# Patient Record
Sex: MALE | Race: WHITE | Employment: OTHER | URBAN - METROPOLITAN AREA
[De-identification: names, ages, dates, MRNs, and addresses within clinical notes are randomized per-mention and may not be internally consistent; named-entity substitution may affect disease eponyms.]

---

## 2017-01-01 ENCOUNTER — OFFICE VISIT (OUTPATIENT)
Dept: CARDIOLOGY CLINIC | Age: 82
End: 2017-01-01

## 2017-01-01 ENCOUNTER — TELEPHONE (OUTPATIENT)
Dept: CARDIOLOGY CLINIC | Age: 82
End: 2017-01-01

## 2017-01-01 VITALS
DIASTOLIC BLOOD PRESSURE: 88 MMHG | SYSTOLIC BLOOD PRESSURE: 136 MMHG | WEIGHT: 234.6 LBS | BODY MASS INDEX: 31.77 KG/M2 | HEART RATE: 88 BPM | HEIGHT: 72 IN

## 2017-01-01 DIAGNOSIS — Z95.0 CARDIAC PACEMAKER IN SITU: ICD-10-CM

## 2017-01-01 DIAGNOSIS — I25.810 CORONARY ATHEROSCLEROSIS OF AUTOLOGOUS VEIN BYPASS GRAFT WITHOUT ANGINA: Primary | Chronic | ICD-10-CM

## 2017-01-01 DIAGNOSIS — I10 ESSENTIAL HYPERTENSION: Chronic | ICD-10-CM

## 2017-01-01 PROCEDURE — 93280 PM DEVICE PROGR EVAL DUAL: CPT | Performed by: INTERNAL MEDICINE

## 2017-01-01 PROCEDURE — 1036F TOBACCO NON-USER: CPT | Performed by: INTERNAL MEDICINE

## 2017-01-01 PROCEDURE — G8598 ASA/ANTIPLAT THER USED: HCPCS | Performed by: INTERNAL MEDICINE

## 2017-01-01 PROCEDURE — G8417 CALC BMI ABV UP PARAM F/U: HCPCS | Performed by: INTERNAL MEDICINE

## 2017-01-01 PROCEDURE — 1123F ACP DISCUSS/DSCN MKR DOCD: CPT | Performed by: INTERNAL MEDICINE

## 2017-01-01 PROCEDURE — G8427 DOCREV CUR MEDS BY ELIG CLIN: HCPCS | Performed by: INTERNAL MEDICINE

## 2017-01-01 PROCEDURE — 99204 OFFICE O/P NEW MOD 45 MIN: CPT | Performed by: INTERNAL MEDICINE

## 2017-01-01 PROCEDURE — G8484 FLU IMMUNIZE NO ADMIN: HCPCS | Performed by: INTERNAL MEDICINE

## 2017-01-01 PROCEDURE — 4040F PNEUMOC VAC/ADMIN/RCVD: CPT | Performed by: INTERNAL MEDICINE

## 2017-01-01 RX ORDER — WARFARIN SODIUM 2 MG/1
TABLET ORAL
Refills: 1 | COMMUNITY
Start: 2017-01-01 | End: 2018-01-01 | Stop reason: DRUGHIGH

## 2017-04-28 ENCOUNTER — HOSPITAL ENCOUNTER (OUTPATIENT)
Dept: SLEEP CENTER | Age: 82
Discharge: HOME | End: 2017-04-28
Attending: INTERNAL MEDICINE | Admitting: INTERNAL MEDICINE

## 2017-05-02 ENCOUNTER — HOSPITAL ENCOUNTER (OUTPATIENT)
Dept: PULMONOLOGY | Age: 82
Discharge: HOME | End: 2017-05-31
Attending: INTERNAL MEDICINE | Admitting: INTERNAL MEDICINE

## 2017-05-10 ENCOUNTER — TELEPHONE (OUTPATIENT)
Dept: PHYSICAL MEDICINE AND REHAB | Age: 82
End: 2017-05-10

## 2017-05-11 ENCOUNTER — OFFICE VISIT (OUTPATIENT)
Dept: PHYSICAL MEDICINE AND REHAB | Age: 82
End: 2017-05-11

## 2017-05-11 DIAGNOSIS — G63 POLYNEUROPATHY ASSOCIATED WITH UNDERLYING DISEASE (HCC): Primary | ICD-10-CM

## 2017-05-11 DIAGNOSIS — R20.2 PARESTHESIA OF BILATERAL LEGS: ICD-10-CM

## 2017-05-11 DIAGNOSIS — R29.898 LEG WEAKNESS, BILATERAL: ICD-10-CM

## 2017-05-11 PROCEDURE — 95910 NRV CNDJ TEST 7-8 STUDIES: CPT | Performed by: PHYSICAL MEDICINE & REHABILITATION

## 2017-05-11 PROCEDURE — 1036F TOBACCO NON-USER: CPT | Performed by: PHYSICAL MEDICINE & REHABILITATION

## 2017-05-11 PROCEDURE — 95886 MUSC TEST DONE W/N TEST COMP: CPT | Performed by: PHYSICAL MEDICINE & REHABILITATION

## 2017-05-19 LAB
AVERAGE GLUCOSE: NORMAL
AVERAGE GLUCOSE: NORMAL
HBA1C MFR BLD: 7.8 %
HBA1C MFR BLD: 7.8 %

## 2017-06-11 PROBLEM — I63.9 CEREBROVASCULAR ACCIDENT (CVA) (HCC): Status: ACTIVE | Noted: 2017-05-22

## 2017-06-11 PROBLEM — I63.9 ISCHEMIC STROKE (HCC): Status: ACTIVE | Noted: 2017-05-18

## 2017-06-11 PROBLEM — I63.321: Status: ACTIVE | Noted: 2017-05-22

## 2017-06-19 PROBLEM — G60.8 POLYNEUROPATHY, PERIPHERAL SENSORIMOTOR AXONAL: Status: ACTIVE | Noted: 2017-06-19

## 2017-06-19 PROBLEM — I69.354 HEMIPLEGIA AND HEMIPARESIS FOLLOWING CEREBRAL INFARCTION AFFECTING LEFT NON-DOMINANT SIDE (HCC): Status: ACTIVE | Noted: 2017-06-19

## 2017-06-19 PROBLEM — R26.9 GAIT DISTURBANCE: Status: ACTIVE | Noted: 2017-06-19

## 2017-06-19 PROBLEM — I69.322 DYSARTHRIA DUE TO RECENT CEREBRAL INFARCTION: Status: ACTIVE | Noted: 2017-06-19

## 2017-11-09 NOTE — PROGRESS NOTES
CARDIOLOGY NOTE      11/9/2017    RE: Mikey Verma  (11/25/1931)                               TO:  Dr. Salome Chatterjee MD      Thank you for involving me in taking care of your  patient Mikey Verma, who is a  80y.o. year old      male with past medical  history of  CAD, HTN, hyperlipidimea, PCI, PPM , CVA, DM is  seen today Patient  during this  visit has no cardiac complains. .      Vitals:    11/09/17 1426   BP: 136/88   Pulse: 88       Current Outpatient Prescriptions   Medication Sig Dispense Refill    warfarin (COUMADIN) 2 MG tablet TAKE 1 TABLET EVERY DAY  1    metFORMIN (GLUCOPHAGE) 500 MG tablet TAKE 1 TABLET BY MOUTH TWICE A DAY FOR DIABETES  0    insulin glargine (LANTUS) 100 UNIT/ML injection vial Inject 20 Units into the skin nightly 1 vial 0    amLODIPine (NORVASC) 10 MG tablet Take 1 tablet by mouth daily 30 tablet 0    dipyridamole-aspirin (AGGRENOX)  MG per extended release capsule Take 1 capsule by mouth 2 times daily 60 capsule 0    atorvastatin (LIPITOR) 40 MG tablet Take 80 mg by mouth nightly       escitalopram (LEXAPRO) 10 MG tablet Take 10 mg by mouth daily      pregabalin (LYRICA) 100 MG capsule Take 100 mg by mouth nightly        No current facility-administered medications for this visit. Allergies: Cephalexin; Inderal [propranolol hcl]; Beta adrenergic blockers; Cephalexin; Glipizide;  Lisinopril; Propranolol; and Tape [adhesive tape]  Past Medical History:   Diagnosis Date    Arthritis     spine, neck    Blood circulation, collateral     CAD (coronary artery disease)     Cancer (HCC)     Collapsed lung     left lung    [facility]     Diabetes mellitus [facility]     Hyperlipidemia     Hypertension     Kidney stone     Kidney stone     per wife this is 33rd stone, also became septic    MI (myocardial infarction)     Pneumonia     Polyneuropathy, peripheral sensorimotor axonal 6/19/2017    Psychiatric problem pulses normal  Abdomen  no tenderness  Musculoskeletal  normal strength  Neurologic    There are  no gross focal neurologic abnormalities. Skin-  No rash  Affect; normal mood    DATA:  Lab Results   Component Value Date    CKTOTAL 40 04/04/2017    TROPONINI <0.006 01/02/2014    TROPONINI <0.006 03/18/2012     BNP:    Lab Results   Component Value Date    BNP 63 01/02/2014     PT/INR:  No results found for: PTINR  Lab Results   Component Value Date    LABA1C 9.5 (H) 09/14/2017    LABA1C 7.8 05/19/2017    LABA1C 7.8 05/19/2017     Lab Results   Component Value Date    CHOL 180 09/15/2017    TRIG 189 (H) 09/15/2017    HDL 34 (L) 09/15/2017    LDLDIRECT 123 (H) 09/15/2017     Lab Results   Component Value Date    ALT 13 09/14/2017    AST 17 09/14/2017     TSH:  No results found for: TSH      QUALITY MEASURES:  CAD:  Yes   CHOL LOWERING:  Yes- if No Why  ANTIPLATELET:  Yes - if No why  BETA BLOCKER    yes,   IF  NO WHY  SMOKING HISTORY no COUNSELLED no  ATRIAL FIBRILLATIONno ANTICOAG: no    Assessment/ Plan:     Patient seen , interviewed and examined      -  Hypertension: Patients blood pressure is normal. Patient is advised about low sodium diet. Present medical regimen will not be changed. -  LIPID MANAGEMENT:  Available lipid  lab data reviewed  and patient was given dietary advice. NCEP- ATP III guidelines reviewed with patient. -   Changes  in medicines made: No                         -   DIABETES MELLITUS: Available pertinent lab data reviewed   and  patient was given dietary advice . Advised to check blood glucose level on a regular basis. -   Changes  in medicines made: No              - PPM will check today        -     CORONARY ARTERY DISEASE: Patient  currently as per anginal classification has   No symptoms           - changes in  treatment:   no  All CAD tests available in records reviewed.        SVG to OM, LIMa to LAD, patent stent, normal EF      - CVA SP TPA  5/17     - PAF on

## 2017-11-09 NOTE — PATIENT INSTRUCTIONS
Please remember to bring all medication bottles or a medication list with you to your appointment. If you have any questions, please call our office at [phone].

## 2018-01-01 ENCOUNTER — HOSPITAL ENCOUNTER (INPATIENT)
Dept: MEDSURG UNIT | Age: 83
LOS: 8 days | DRG: 057 | End: 2018-09-24
Attending: FAMILY MEDICINE | Admitting: FAMILY MEDICINE
Payer: COMMERCIAL

## 2018-01-01 ENCOUNTER — TELEPHONE (OUTPATIENT)
Dept: CARDIOLOGY CLINIC | Age: 83
End: 2018-01-01

## 2018-01-01 ENCOUNTER — HOSPITAL ENCOUNTER (OUTPATIENT)
Dept: GENERAL RADIOLOGY | Age: 83
Discharge: HOME | End: 2018-04-20
Attending: ORTHOPAEDIC SURGERY | Admitting: ORTHOPAEDIC SURGERY

## 2018-01-01 ENCOUNTER — NURSE ONLY (OUTPATIENT)
Dept: CARDIOLOGY CLINIC | Age: 83
End: 2018-01-01

## 2018-01-01 ENCOUNTER — OFFICE VISIT (OUTPATIENT)
Dept: CARDIOLOGY CLINIC | Age: 83
End: 2018-01-01

## 2018-01-01 ENCOUNTER — PROCEDURE VISIT (OUTPATIENT)
Dept: CARDIOLOGY CLINIC | Age: 83
End: 2018-01-01

## 2018-01-01 ENCOUNTER — HOSPITAL ENCOUNTER (OUTPATIENT)
Dept: GENERAL RADIOLOGY | Age: 83
Discharge: HOME | End: 2018-05-14
Attending: ORTHOPAEDIC SURGERY | Admitting: ORTHOPAEDIC SURGERY

## 2018-01-01 ENCOUNTER — HOSPITAL ENCOUNTER (OUTPATIENT)
Dept: GENERAL RADIOLOGY | Age: 83
Discharge: HOME | End: 2018-06-08

## 2018-01-01 ENCOUNTER — HOSPITAL ENCOUNTER (OUTPATIENT)
Dept: GENERAL RADIOLOGY | Age: 83
Discharge: HOME | End: 2018-05-14

## 2018-01-01 ENCOUNTER — HOSPITAL ENCOUNTER (OUTPATIENT)
Dept: GENERAL RADIOLOGY | Age: 83
Discharge: HOME | End: 2018-05-11
Attending: ORTHOPAEDIC SURGERY | Admitting: ORTHOPAEDIC SURGERY

## 2018-01-01 ENCOUNTER — HOSPITAL ENCOUNTER (OUTPATIENT)
Dept: NUCLEAR MEDICINE | Age: 83
Discharge: HOME | End: 2018-05-03
Attending: ORTHOPAEDIC SURGERY | Admitting: ORTHOPAEDIC SURGERY

## 2018-01-01 ENCOUNTER — HOSPITAL ENCOUNTER (OUTPATIENT)
Dept: GENERAL RADIOLOGY | Age: 83
Discharge: HOME | End: 2018-02-15
Attending: INTERNAL MEDICINE | Admitting: INTERNAL MEDICINE

## 2018-01-01 VITALS
HEART RATE: 84 BPM | DIASTOLIC BLOOD PRESSURE: 60 MMHG | WEIGHT: 226 LBS | SYSTOLIC BLOOD PRESSURE: 114 MMHG | BODY MASS INDEX: 29.95 KG/M2 | HEIGHT: 73 IN

## 2018-01-01 VITALS
WEIGHT: 224 LBS | HEART RATE: 84 BPM | SYSTOLIC BLOOD PRESSURE: 142 MMHG | BODY MASS INDEX: 29.69 KG/M2 | DIASTOLIC BLOOD PRESSURE: 80 MMHG | HEIGHT: 73 IN

## 2018-01-01 VITALS
HEART RATE: 68 BPM | BODY MASS INDEX: 28.58 KG/M2 | HEIGHT: 72 IN | WEIGHT: 211 LBS | DIASTOLIC BLOOD PRESSURE: 76 MMHG | SYSTOLIC BLOOD PRESSURE: 118 MMHG

## 2018-01-01 VITALS
DIASTOLIC BLOOD PRESSURE: 60 MMHG | WEIGHT: 203 LBS | OXYGEN SATURATION: 87 % | HEART RATE: 154 BPM | BODY MASS INDEX: 29.06 KG/M2 | SYSTOLIC BLOOD PRESSURE: 110 MMHG | RESPIRATION RATE: 35 BRPM | TEMPERATURE: 101.6 F | HEIGHT: 70 IN

## 2018-01-01 VITALS — HEART RATE: 84 BPM | DIASTOLIC BLOOD PRESSURE: 86 MMHG | SYSTOLIC BLOOD PRESSURE: 142 MMHG

## 2018-01-01 VITALS — HEART RATE: 68 BPM | SYSTOLIC BLOOD PRESSURE: 118 MMHG | DIASTOLIC BLOOD PRESSURE: 76 MMHG

## 2018-01-01 DIAGNOSIS — G89.29 CHRONIC BILATERAL LOW BACK PAIN WITH BILATERAL SCIATICA: ICD-10-CM

## 2018-01-01 DIAGNOSIS — M54.42 CHRONIC BILATERAL LOW BACK PAIN WITH BILATERAL SCIATICA: ICD-10-CM

## 2018-01-01 DIAGNOSIS — I10 ESSENTIAL HYPERTENSION: Chronic | ICD-10-CM

## 2018-01-01 DIAGNOSIS — Z95.0 CARDIAC PACEMAKER IN SITU: Primary | ICD-10-CM

## 2018-01-01 DIAGNOSIS — R94.31 EKG ABNORMALITIES: ICD-10-CM

## 2018-01-01 DIAGNOSIS — I25.700 CORONARY ARTERY DISEASE INVOLVING CORONARY BYPASS GRAFT OF NATIVE HEART WITH UNSTABLE ANGINA PECTORIS (HCC): ICD-10-CM

## 2018-01-01 DIAGNOSIS — I25.810 CORONARY ATHEROSCLEROSIS OF AUTOLOGOUS VEIN BYPASS GRAFT WITHOUT ANGINA: Primary | Chronic | ICD-10-CM

## 2018-01-01 DIAGNOSIS — I25.810 CORONARY ATHEROSCLEROSIS OF AUTOLOGOUS VEIN BYPASS GRAFT WITHOUT ANGINA: Chronic | ICD-10-CM

## 2018-01-01 DIAGNOSIS — Z45.010 PACER AT END OF BATTERY LIFE: Primary | ICD-10-CM

## 2018-01-01 DIAGNOSIS — R07.2 PRECORDIAL PAIN: ICD-10-CM

## 2018-01-01 DIAGNOSIS — M54.41 CHRONIC BILATERAL LOW BACK PAIN WITH BILATERAL SCIATICA: ICD-10-CM

## 2018-01-01 DIAGNOSIS — M47.817 ARTHRITIS OF LUMBOSACRAL SPINE: ICD-10-CM

## 2018-01-01 DIAGNOSIS — M54.5 LOW BACK PAIN, UNSPECIFIED BACK PAIN LATERALITY, UNSPECIFIED CHRONICITY, WITH SCIATICA PRESENCE UNSPECIFIED: ICD-10-CM

## 2018-01-01 DIAGNOSIS — R07.2 PRECORDIAL PAIN: Primary | ICD-10-CM

## 2018-01-01 LAB
ALBUMIN SERPL-MCNC: 3.5 G/DL
ALBUMIN SERPL-MCNC: 4 GM/DL (ref 3.4–5)
ALP BLD-CCNC: 73 U/L
ALP BLD-CCNC: 94 IU/L (ref 40–129)
ALT SERPL-CCNC: 22 U/L (ref 10–40)
ALT SERPL-CCNC: 6 U/L
ANION GAP SERPL CALCULATED.3IONS-SCNC: 15 MMOL/L (ref 4–16)
ANION GAP SERPL CALCULATED.3IONS-SCNC: NORMAL MMOL/L
AST SERPL-CCNC: 11 U/L
AST SERPL-CCNC: 27 IU/L (ref 15–37)
BASOPHILS ABSOLUTE: ABNORMAL /ΜL
BASOPHILS RELATIVE PERCENT: ABNORMAL %
BILIRUB SERPL-MCNC: 0.3 MG/DL (ref 0.1–1.4)
BILIRUB SERPL-MCNC: 0.4 MG/DL (ref 0–1)
BUN BLDV-MCNC: 15 MG/DL (ref 6–23)
BUN BLDV-MCNC: 20 MG/DL
CALCIUM SERPL-MCNC: 9.1 MG/DL
CALCIUM SERPL-MCNC: 9.2 MG/DL (ref 8.3–10.6)
CHLORIDE BLD-SCNC: 103 MMOL/L (ref 99–110)
CHLORIDE BLD-SCNC: 105 MMOL/L
CO2: 26 MMOL/L (ref 21–32)
CO2: 27 MMOL/L
CREAT SERPL-MCNC: 1.1 MG/DL
CREAT SERPL-MCNC: 1.3 MG/DL (ref 0.9–1.3)
EOSINOPHILS ABSOLUTE: ABNORMAL /ΜL
EOSINOPHILS RELATIVE PERCENT: ABNORMAL %
GFR AFRICAN AMERICAN: >60 ML/MIN/1.73M2
GFR CALCULATED: NORMAL
GFR NON-AFRICAN AMERICAN: 52 ML/MIN/1.73M2
GLUCOSE BLD-MCNC: 156 MG/DL
GLUCOSE FASTING: 212 MG/DL (ref 70–99)
HCT VFR BLD CALC: 38.4 % (ref 41–53)
HCT VFR BLD CALC: 43.8 % (ref 42–52)
HEMOGLOBIN: 12.6 G/DL (ref 13.5–17.5)
HEMOGLOBIN: 13.4 GM/DL (ref 13.5–18)
INR BLD: 2.1
LV EF: 52 %
LVEF MODALITY: NORMAL
LYMPHOCYTES ABSOLUTE: ABNORMAL /ΜL
LYMPHOCYTES RELATIVE PERCENT: ABNORMAL %
MCH RBC QN AUTO: 28.5 PG (ref 27–31)
MCH RBC QN AUTO: 28.8 PG
MCHC RBC AUTO-ENTMCNC: 30.6 % (ref 32–36)
MCHC RBC AUTO-ENTMCNC: 32.8 G/DL
MCV RBC AUTO: 87.9 FL
MCV RBC AUTO: 93 FL (ref 78–100)
MONOCYTES ABSOLUTE: ABNORMAL /ΜL
MONOCYTES RELATIVE PERCENT: ABNORMAL %
NEUTROPHILS ABSOLUTE: ABNORMAL /ΜL
NEUTROPHILS RELATIVE PERCENT: ABNORMAL %
PDW BLD-RTO: 13.5 % (ref 11.7–14.9)
PLATELET # BLD: 290 K/ΜL
PLATELET # BLD: 313 K/CU MM (ref 140–440)
PMV BLD AUTO: 11.4 FL (ref 7.5–11.1)
PMV BLD AUTO: ABNORMAL FL
POTASSIUM SERPL-SCNC: 4.1 MMOL/L
POTASSIUM SERPL-SCNC: 4.6 MMOL/L (ref 3.5–5.1)
PROTIME: NORMAL SECONDS
RBC # BLD: 4.37 10^6/ΜL
RBC # BLD: 4.71 M/CU MM (ref 4.6–6.2)
SODIUM BLD-SCNC: 142 MMOL/L
SODIUM BLD-SCNC: 144 MMOL/L (ref 135–145)
TOTAL PROTEIN: 6.3
TOTAL PROTEIN: 6.3 GM/DL (ref 6.4–8.2)
WBC # BLD: 7.4 10^3/ML
WBC # BLD: 9.9 K/CU MM (ref 4–10.5)

## 2018-01-01 PROCEDURE — 1036F TOBACCO NON-USER: CPT | Performed by: INTERNAL MEDICINE

## 2018-01-01 PROCEDURE — 99214 OFFICE O/P EST MOD 30 MIN: CPT | Performed by: INTERNAL MEDICINE

## 2018-01-01 PROCEDURE — G8598 ASA/ANTIPLAT THER USED: HCPCS | Performed by: INTERNAL MEDICINE

## 2018-01-01 PROCEDURE — 94761 N-INVAS EAR/PLS OXIMETRY MLT: CPT

## 2018-01-01 PROCEDURE — G8484 FLU IMMUNIZE NO ADMIN: HCPCS | Performed by: INTERNAL MEDICINE

## 2018-01-01 PROCEDURE — 1250000000 HC SEMI PRIVATE HOSPICE R&B

## 2018-01-01 PROCEDURE — 4040F PNEUMOC VAC/ADMIN/RCVD: CPT | Performed by: INTERNAL MEDICINE

## 2018-01-01 PROCEDURE — G8417 CALC BMI ABV UP PARAM F/U: HCPCS | Performed by: INTERNAL MEDICINE

## 2018-01-01 PROCEDURE — 1123F ACP DISCUSS/DSCN MKR DOCD: CPT | Performed by: INTERNAL MEDICINE

## 2018-01-01 PROCEDURE — G8427 DOCREV CUR MEDS BY ELIG CLIN: HCPCS | Performed by: INTERNAL MEDICINE

## 2018-01-01 PROCEDURE — 9990 CHARGE CONVERSION

## 2018-01-01 PROCEDURE — 2580000003 HC RX 258: Performed by: INTERNAL MEDICINE

## 2018-01-01 PROCEDURE — 93015 CV STRESS TEST SUPVJ I&R: CPT | Performed by: INTERNAL MEDICINE

## 2018-01-01 PROCEDURE — 2500000003 HC RX 250 WO HCPCS: Performed by: FAMILY MEDICINE

## 2018-01-01 PROCEDURE — 84439 ASSAY OF FREE THYROXINE: CPT

## 2018-01-01 PROCEDURE — 6360000002 HC RX W HCPCS: Performed by: FAMILY MEDICINE

## 2018-01-01 PROCEDURE — 93280 PM DEVICE PROGR EVAL DUAL: CPT | Performed by: INTERNAL MEDICINE

## 2018-01-01 PROCEDURE — 6360000002 HC RX W HCPCS: Performed by: INTERNAL MEDICINE

## 2018-01-01 PROCEDURE — 2500000003 HC RX 250 WO HCPCS: Performed by: INTERNAL MEDICINE

## 2018-01-01 PROCEDURE — 51702 INSERT TEMP BLADDER CATH: CPT

## 2018-01-01 PROCEDURE — 2500000003 HC RX 250 WO HCPCS

## 2018-01-01 PROCEDURE — 2580000003 HC RX 258

## 2018-01-01 PROCEDURE — 78452 HT MUSCLE IMAGE SPECT MULT: CPT | Performed by: INTERNAL MEDICINE

## 2018-01-01 PROCEDURE — 6370000000 HC RX 637 (ALT 250 FOR IP): Performed by: FAMILY MEDICINE

## 2018-01-01 PROCEDURE — A9500 TC99M SESTAMIBI: HCPCS | Performed by: INTERNAL MEDICINE

## 2018-01-01 PROCEDURE — 0T9B70Z DRAINAGE OF BLADDER WITH DRAINAGE DEVICE, VIA NATURAL OR ARTIFICIAL OPENING: ICD-10-PCS | Performed by: FAMILY MEDICINE

## 2018-01-01 PROCEDURE — 84443 ASSAY THYROID STIM HORMONE: CPT

## 2018-01-01 RX ORDER — TC 99M MEDRONATE 20 MG/10ML
25 INJECTION, POWDER, LYOPHILIZED, FOR SOLUTION INTRAVENOUS
Status: COMPLETED | OUTPATIENT
Start: 2018-01-01 | End: 2018-01-01

## 2018-01-01 RX ORDER — ATORVASTATIN CALCIUM 80 MG/1
80 TABLET, FILM COATED ORAL DAILY
Qty: 90 TABLET | Refills: 3 | Status: SHIPPED | OUTPATIENT
Start: 2018-01-01 | End: 2018-01-01 | Stop reason: ALTCHOICE

## 2018-01-01 RX ORDER — LORAZEPAM 2 MG/ML
2 INJECTION INTRAMUSCULAR
Status: DISCONTINUED | OUTPATIENT
Start: 2018-01-01 | End: 2018-09-25 | Stop reason: HOSPADM

## 2018-01-01 RX ORDER — GABAPENTIN 300 MG/1
CAPSULE ORAL
COMMUNITY

## 2018-01-01 RX ORDER — AMLODIPINE BESYLATE 10 MG/1
TABLET ORAL
COMMUNITY

## 2018-01-01 RX ORDER — HYDROCODONE BITARTRATE AND ACETAMINOPHEN 5; 325 MG/1; MG/1
TABLET ORAL
COMMUNITY

## 2018-01-01 RX ORDER — AMLODIPINE BESYLATE 5 MG/1
10 TABLET ORAL
COMMUNITY
Start: 2018-01-01

## 2018-01-01 RX ORDER — GABAPENTIN 100 MG/1
100 CAPSULE ORAL NIGHTLY
COMMUNITY
End: 2018-01-01 | Stop reason: ALTCHOICE

## 2018-01-01 RX ORDER — MORPHINE SULFATE 2 MG/ML
2 INJECTION, SOLUTION INTRAMUSCULAR; INTRAVENOUS
Status: DISCONTINUED | OUTPATIENT
Start: 2018-01-01 | End: 2018-01-01

## 2018-01-01 RX ORDER — HYDROXYZINE HYDROCHLORIDE 25 MG/1
TABLET, FILM COATED ORAL
COMMUNITY

## 2018-01-01 RX ORDER — ALBUTEROL SULFATE 2.5 MG/3ML
2.5 SOLUTION RESPIRATORY (INHALATION) EVERY 4 HOURS PRN
Status: DISCONTINUED | OUTPATIENT
Start: 2018-01-01 | End: 2018-09-25 | Stop reason: HOSPADM

## 2018-01-01 RX ORDER — LEVETIRACETAM 250 MG/1
TABLET ORAL
Refills: 1 | COMMUNITY
Start: 2018-01-01

## 2018-01-01 RX ORDER — ASPIRIN AND DIPYRIDAMOLE 25; 200 MG/1; MG/1
CAPSULE, EXTENDED RELEASE ORAL
COMMUNITY

## 2018-01-01 RX ORDER — GLYCOPYRROLATE 1 MG/5 ML
0.1 SYRINGE (ML) INTRAVENOUS EVERY 4 HOURS PRN
Status: DISCONTINUED | OUTPATIENT
Start: 2018-01-01 | End: 2018-09-25 | Stop reason: HOSPADM

## 2018-01-01 RX ORDER — FUROSEMIDE 10 MG/ML
20 INJECTION INTRAMUSCULAR; INTRAVENOUS DAILY PRN
Status: DISCONTINUED | OUTPATIENT
Start: 2018-01-01 | End: 2018-09-25 | Stop reason: HOSPADM

## 2018-01-01 RX ORDER — LORAZEPAM 2 MG/ML
1 INJECTION INTRAMUSCULAR EVERY 6 HOURS
Status: DISCONTINUED | OUTPATIENT
Start: 2018-01-01 | End: 2018-09-25 | Stop reason: HOSPADM

## 2018-01-01 RX ORDER — MORPHINE SULFATE 4 MG/ML
2 INJECTION, SOLUTION INTRAMUSCULAR; INTRAVENOUS
Status: DISCONTINUED | OUTPATIENT
Start: 2018-01-01 | End: 2018-09-25 | Stop reason: HOSPADM

## 2018-01-01 RX ORDER — MORPHINE SULFATE 4 MG/ML
INJECTION, SOLUTION INTRAMUSCULAR; INTRAVENOUS
Status: COMPLETED
Start: 2018-01-01 | End: 2018-01-01

## 2018-01-01 RX ORDER — WARFARIN SODIUM 2.5 MG/1
TABLET ORAL
COMMUNITY

## 2018-01-01 RX ORDER — HALOPERIDOL 5 MG/ML
1 INJECTION INTRAMUSCULAR
Status: DISCONTINUED | OUTPATIENT
Start: 2018-01-01 | End: 2018-09-25 | Stop reason: HOSPADM

## 2018-01-01 RX ORDER — ACETAMINOPHEN 650 MG/1
650 SUPPOSITORY RECTAL EVERY 4 HOURS PRN
Status: DISCONTINUED | OUTPATIENT
Start: 2018-01-01 | End: 2018-09-25 | Stop reason: HOSPADM

## 2018-01-01 RX ORDER — LORAZEPAM 2 MG/ML
1 INJECTION INTRAMUSCULAR
Status: DISCONTINUED | OUTPATIENT
Start: 2018-01-01 | End: 2018-09-25 | Stop reason: HOSPADM

## 2018-01-01 RX ORDER — FUROSEMIDE 20 MG/1
20 TABLET ORAL
COMMUNITY
Start: 2018-01-01 | End: 2018-01-01

## 2018-01-01 RX ORDER — ESCITALOPRAM OXALATE 10 MG/1
TABLET ORAL
COMMUNITY

## 2018-01-01 RX ORDER — METOPROLOL TARTRATE 50 MG/1
TABLET, FILM COATED ORAL
COMMUNITY

## 2018-01-01 RX ADMIN — VALPROATE SODIUM 500 MG: 100 INJECTION, SOLUTION INTRAVENOUS at 16:41

## 2018-01-01 RX ADMIN — MORPHINE SULFATE 2 MG: 4 INJECTION INTRAVENOUS at 11:09

## 2018-01-01 RX ADMIN — FUROSEMIDE 20 MG: 10 INJECTION, SOLUTION INTRAVENOUS at 14:35

## 2018-01-01 RX ADMIN — MORPHINE SULFATE 2 MG: 4 INJECTION INTRAVENOUS at 15:32

## 2018-01-01 RX ADMIN — VALPROATE SODIUM 500 MG: 100 INJECTION, SOLUTION INTRAVENOUS at 04:28

## 2018-01-01 RX ADMIN — LEVETIRACETAM 500 MG: 100 INJECTION, SOLUTION INTRAVENOUS at 02:03

## 2018-01-01 RX ADMIN — ACETAMINOPHEN 650 MG: 650 SUPPOSITORY RECTAL at 10:54

## 2018-01-01 RX ADMIN — LORAZEPAM 1 MG: 2 INJECTION INTRAMUSCULAR; INTRAVENOUS at 09:53

## 2018-01-01 RX ADMIN — VALPROATE SODIUM 500 MG: 100 INJECTION, SOLUTION INTRAVENOUS at 14:23

## 2018-01-01 RX ADMIN — LEVETIRACETAM 500 MG: 100 INJECTION, SOLUTION INTRAVENOUS at 13:39

## 2018-01-01 RX ADMIN — LEVETIRACETAM 500 MG: 100 INJECTION, SOLUTION INTRAVENOUS at 02:14

## 2018-01-01 RX ADMIN — VALPROATE SODIUM 500 MG: 100 INJECTION, SOLUTION INTRAVENOUS at 15:46

## 2018-01-01 RX ADMIN — LEVETIRACETAM 500 MG: 100 INJECTION, SOLUTION INTRAVENOUS at 02:25

## 2018-01-01 RX ADMIN — LORAZEPAM 1 MG: 2 INJECTION INTRAMUSCULAR; INTRAVENOUS at 14:35

## 2018-01-01 RX ADMIN — LORAZEPAM 1 MG: 2 INJECTION INTRAMUSCULAR; INTRAVENOUS at 12:42

## 2018-01-01 RX ADMIN — MORPHINE SULFATE 2 MG: 4 INJECTION INTRAVENOUS at 17:42

## 2018-01-01 RX ADMIN — MORPHINE SULFATE 2 MG: 4 INJECTION INTRAVENOUS at 18:21

## 2018-01-01 RX ADMIN — ACETAMINOPHEN 650 MG: 650 SUPPOSITORY RECTAL at 11:40

## 2018-01-01 RX ADMIN — VALPROATE SODIUM 500 MG: 100 INJECTION, SOLUTION INTRAVENOUS at 03:27

## 2018-01-01 RX ADMIN — MORPHINE SULFATE 2 MG: 4 INJECTION INTRAVENOUS at 13:39

## 2018-01-01 RX ADMIN — ACETAMINOPHEN 650 MG: 650 SUPPOSITORY RECTAL at 18:21

## 2018-01-01 RX ADMIN — LEVETIRACETAM 500 MG: 100 INJECTION, SOLUTION INTRAVENOUS at 03:06

## 2018-01-01 RX ADMIN — MORPHINE SULFATE 2 MG: 4 INJECTION INTRAVENOUS at 13:25

## 2018-01-01 RX ADMIN — LORAZEPAM 2 MG: 2 INJECTION INTRAMUSCULAR at 23:31

## 2018-01-01 RX ADMIN — VALPROATE SODIUM 500 MG: 100 INJECTION, SOLUTION INTRAVENOUS at 15:23

## 2018-01-01 RX ADMIN — MORPHINE SULFATE 2 MG: 4 INJECTION, SOLUTION INTRAMUSCULAR; INTRAVENOUS at 17:27

## 2018-01-01 RX ADMIN — MORPHINE SULFATE 2 MG: 4 INJECTION INTRAVENOUS at 17:06

## 2018-01-01 RX ADMIN — LEVETIRACETAM 500 MG: 100 INJECTION, SOLUTION INTRAVENOUS at 15:00

## 2018-01-01 RX ADMIN — LORAZEPAM 1 MG: 2 INJECTION INTRAMUSCULAR; INTRAVENOUS at 11:09

## 2018-01-01 RX ADMIN — VALPROATE SODIUM 500 MG: 100 INJECTION, SOLUTION INTRAVENOUS at 02:12

## 2018-01-01 RX ADMIN — MORPHINE SULFATE 2 MG: 4 INJECTION INTRAVENOUS at 18:36

## 2018-01-01 RX ADMIN — LEVETIRACETAM 500 MG: 100 INJECTION, SOLUTION INTRAVENOUS at 13:38

## 2018-01-01 RX ADMIN — MORPHINE SULFATE 2 MG: 4 INJECTION INTRAVENOUS at 16:40

## 2018-01-01 RX ADMIN — LORAZEPAM 1 MG: 2 INJECTION INTRAMUSCULAR; INTRAVENOUS at 20:47

## 2018-01-01 RX ADMIN — LORAZEPAM 1 MG: 2 INJECTION INTRAMUSCULAR; INTRAVENOUS at 04:13

## 2018-01-01 RX ADMIN — LEVETIRACETAM 500 MG: 100 INJECTION, SOLUTION INTRAVENOUS at 01:58

## 2018-01-01 RX ADMIN — MORPHINE SULFATE 2 MG: 4 INJECTION INTRAVENOUS at 13:49

## 2018-01-01 RX ADMIN — MORPHINE SULFATE 2 MG: 4 INJECTION INTRAVENOUS at 20:47

## 2018-01-01 RX ADMIN — LORAZEPAM 1 MG: 2 INJECTION INTRAMUSCULAR; INTRAVENOUS at 16:59

## 2018-01-01 RX ADMIN — LORAZEPAM 1 MG: 2 INJECTION INTRAMUSCULAR; INTRAVENOUS at 17:42

## 2018-01-01 RX ADMIN — MORPHINE SULFATE 2 MG: 4 INJECTION INTRAVENOUS at 08:21

## 2018-01-01 RX ADMIN — MORPHINE SULFATE 2 MG: 4 INJECTION INTRAVENOUS at 11:33

## 2018-01-01 RX ADMIN — LEVETIRACETAM 500 MG: 100 INJECTION, SOLUTION INTRAVENOUS at 14:44

## 2018-01-01 RX ADMIN — MORPHINE SULFATE 2 MG: 2 INJECTION, SOLUTION INTRAMUSCULAR; INTRAVENOUS at 17:29

## 2018-01-01 RX ADMIN — MORPHINE SULFATE 2 MG: 4 INJECTION INTRAVENOUS at 13:26

## 2018-01-01 RX ADMIN — LORAZEPAM 1 MG: 2 INJECTION INTRAMUSCULAR; INTRAVENOUS at 08:21

## 2018-01-01 RX ADMIN — MORPHINE SULFATE 2 MG: 4 INJECTION INTRAVENOUS at 11:03

## 2018-01-01 RX ADMIN — MORPHINE SULFATE 2 MG: 4 INJECTION INTRAVENOUS at 10:53

## 2018-01-01 RX ADMIN — VALPROATE SODIUM 500 MG: 100 INJECTION, SOLUTION INTRAVENOUS at 03:02

## 2018-01-01 RX ADMIN — MORPHINE SULFATE 2 MG: 4 INJECTION INTRAVENOUS at 19:38

## 2018-01-01 RX ADMIN — LORAZEPAM 1 MG: 2 INJECTION INTRAMUSCULAR; INTRAVENOUS at 15:59

## 2018-01-01 RX ADMIN — Medication 0.1 MG: at 15:30

## 2018-01-01 RX ADMIN — LEVETIRACETAM 500 MG: 100 INJECTION, SOLUTION INTRAVENOUS at 14:34

## 2018-01-01 RX ADMIN — MORPHINE SULFATE 2 MG: 4 INJECTION INTRAVENOUS at 15:09

## 2018-01-01 RX ADMIN — MORPHINE SULFATE 2 MG: 4 INJECTION INTRAVENOUS at 11:48

## 2018-01-01 RX ADMIN — LEVETIRACETAM 500 MG: 100 INJECTION, SOLUTION INTRAVENOUS at 02:07

## 2018-01-01 RX ADMIN — VALPROATE SODIUM 500 MG: 100 INJECTION, SOLUTION INTRAVENOUS at 03:05

## 2018-01-01 RX ADMIN — LORAZEPAM 1 MG: 2 INJECTION INTRAMUSCULAR; INTRAVENOUS at 02:34

## 2018-01-01 RX ADMIN — LORAZEPAM 1 MG: 2 INJECTION INTRAMUSCULAR; INTRAVENOUS at 12:08

## 2018-01-01 RX ADMIN — TC 99M MEDRONATE 25 MILLICURIE: 20 INJECTION, POWDER, LYOPHILIZED, FOR SOLUTION INTRAVENOUS at 10:10

## 2018-01-01 RX ADMIN — VALPROATE SODIUM 500 MG: 100 INJECTION, SOLUTION INTRAVENOUS at 15:02

## 2018-01-01 RX ADMIN — LORAZEPAM 1 MG: 2 INJECTION INTRAMUSCULAR; INTRAVENOUS at 15:39

## 2018-01-01 RX ADMIN — VALPROATE SODIUM 500 MG: 100 INJECTION, SOLUTION INTRAVENOUS at 04:13

## 2018-01-01 RX ADMIN — MORPHINE SULFATE 2 MG: 4 INJECTION INTRAVENOUS at 20:38

## 2018-01-01 RX ADMIN — LEVETIRACETAM 500 MG: 100 INJECTION, SOLUTION INTRAVENOUS at 02:32

## 2018-01-01 RX ADMIN — MORPHINE SULFATE 2 MG: 4 INJECTION INTRAVENOUS at 18:33

## 2018-01-01 RX ADMIN — VALPROATE SODIUM 500 MG: 100 INJECTION, SOLUTION INTRAVENOUS at 15:33

## 2018-01-01 RX ADMIN — LORAZEPAM 1 MG: 2 INJECTION INTRAMUSCULAR; INTRAVENOUS at 18:34

## 2018-01-01 RX ADMIN — MORPHINE SULFATE 2 MG: 4 INJECTION INTRAVENOUS at 03:06

## 2018-01-01 RX ADMIN — MORPHINE SULFATE 2 MG: 4 INJECTION INTRAVENOUS at 11:16

## 2018-01-01 RX ADMIN — MORPHINE SULFATE 2 MG: 4 INJECTION INTRAVENOUS at 17:59

## 2018-01-01 RX ADMIN — VALPROATE SODIUM 500 MG: 100 INJECTION, SOLUTION INTRAVENOUS at 03:00

## 2018-01-01 RX ADMIN — LEVETIRACETAM 500 MG: 100 INJECTION, SOLUTION INTRAVENOUS at 13:26

## 2018-01-01 RX ADMIN — LEVETIRACETAM 500 MG: 100 INJECTION, SOLUTION INTRAVENOUS at 14:07

## 2018-01-01 RX ADMIN — LORAZEPAM 1 MG: 2 INJECTION INTRAMUSCULAR; INTRAVENOUS at 17:59

## 2018-01-01 RX ADMIN — VALPROATE SODIUM 500 MG: 100 INJECTION, SOLUTION INTRAVENOUS at 14:45

## 2018-01-01 RX ADMIN — MORPHINE SULFATE 2 MG: 4 INJECTION INTRAVENOUS at 15:39

## 2018-01-01 RX ADMIN — LEVETIRACETAM 500 MG: 100 INJECTION, SOLUTION INTRAVENOUS at 13:41

## 2018-01-01 RX ADMIN — MORPHINE SULFATE 2 MG: 4 INJECTION INTRAVENOUS at 13:38

## 2018-01-01 RX ADMIN — LEVETIRACETAM 500 MG: 100 INJECTION, SOLUTION INTRAVENOUS at 16:18

## 2018-01-01 RX ADMIN — Medication 0.1 MG: at 11:19

## 2018-01-01 RX ADMIN — LEVETIRACETAM 500 MG: 100 INJECTION, SOLUTION INTRAVENOUS at 02:06

## 2018-01-01 ASSESSMENT — PAIN SCALES - GENERAL
PAINLEVEL_OUTOF10: 0
PAINLEVEL_OUTOF10: 5
PAINLEVEL_OUTOF10: 0
PAINLEVEL_OUTOF10: 0
PAINLEVEL_OUTOF10: 5
PAINLEVEL_OUTOF10: 8
PAINLEVEL_OUTOF10: 0
PAINLEVEL_OUTOF10: 1
PAINLEVEL_OUTOF10: 0
PAINLEVEL_OUTOF10: 0
PAINLEVEL_OUTOF10: 4
PAINLEVEL_OUTOF10: 0
PAINLEVEL_OUTOF10: 2
PAINLEVEL_OUTOF10: 0

## 2018-01-01 ASSESSMENT — PAIN DESCRIPTION - PAIN TYPE: TYPE: CHRONIC PAIN

## 2018-01-01 ASSESSMENT — PAIN DESCRIPTION - LOCATION: LOCATION: GENERALIZED

## 2018-01-11 NOTE — TELEPHONE ENCOUNTER
Wife called stated that her  has been having some intense    neck pain , he doses have a known blockage , His bp was 150/80  She is very concerned please call didn't know if she should take to ER

## 2018-01-17 PROBLEM — R94.31 EKG ABNORMALITIES: Status: ACTIVE | Noted: 2018-01-01

## 2018-01-17 PROBLEM — I25.700 CORONARY ARTERY DISEASE INVOLVING CORONARY BYPASS GRAFT OF NATIVE HEART WITH UNSTABLE ANGINA PECTORIS (HCC): Status: ACTIVE | Noted: 2018-01-01

## 2018-01-17 PROBLEM — R07.2 PRECORDIAL PAIN: Status: ACTIVE | Noted: 2018-01-01

## 2018-01-17 NOTE — PROGRESS NOTES
CARDIOLOGY NOTE      1/17/2018    RE: Janet Longoria  (11/25/1931)                               TO:  Dr. Tabatha Gongora MD      Thank you for involving me in taking care of your  patient Janet Longoria, who is a  80y.o. 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  warfarin (COUMADIN) 2 MG tablet 2.5mg daily  1    metFORMIN (GLUCOPHAGE) 500 MG tablet TAKE 1 TABLET BY MOUTH TWICE A DAY FOR DIABETES  0    insulin glargine (LANTUS) 100 UNIT/ML injection vial Inject 20 Units into the skin nightly 1 vial 0    amLODIPine (NORVASC) 10 MG tablet Take 1 tablet by mouth daily 30 tablet 0    dipyridamole-aspirin (AGGRENOX)  MG per extended release capsule Take 1 capsule by mouth 2 times daily 60 capsule 0    atorvastatin (LIPITOR) 40 MG tablet Take 80 mg by mouth nightly       escitalopram (LEXAPRO) 10 MG tablet Take 10 mg by mouth daily       No current facility-administered medications for this visit. Allergies: Cephalexin; Inderal [propranolol hcl]; Beta adrenergic blockers; Cephalexin; Glipizide;  Lisinopril; Propranolol; and Tape [adhesive tape]  Past Medical History:   Diagnosis Date    Arthritis     spine, neck    Blood circulation, collateral     CAD (coronary artery disease)     Cancer (HCC)     Collapsed lung     left lung    [facility]     Diabetes mellitus ([facility]     H/O cardiovascular stress test 09/08/2017    EF 26% smalll ischemic distal LAD    History of Doppler echocardiogram 111/25/2017    carotid mod plaque EV, mild plaque LICA    carotid bruits. Cardiovascular  Normal S1 and S2 without obvious murmur or gallop. Extremities - No cyanosis, clubbing, or significant edema. Pulmonary  No respiratory distress. No wheezes or rales. Pulses: Bilateral radial and pedal pulses normal  Abdomen  no tenderness  Musculoskeletal  normal strength  Neurologic    There are  no gross focal neurologic abnormalities. Skin-  No rash  Affect; normal mood    DATA:  Lab Results   Component Value Date    CKTOTAL 40 04/04/2017    TROPONINI <0.006 01/02/2014    TROPONINI <0.006 03/18/2012     BNP:    Lab Results   Component Value Date    BNP 63 01/02/2014     PT/INR:  No results found for: PTINR  Lab Results   Component Value Date    LABA1C 9.5 (H) 09/14/2017    LABA1C 7.8 05/19/2017    LABA1C 7.8 05/19/2017     Lab Results   Component Value Date    CHOL 180 09/15/2017    TRIG 189 (H) 09/15/2017    HDL 34 (L) 09/15/2017    LDLDIRECT 123 (H) 09/15/2017     Lab Results   Component Value Date    ALT 13 09/14/2017    AST 17 09/14/2017     TSH:  No results found for: TSH      QUALITY MEASURES:  CAD:  Yes   CHOL LOWERING:  Yes- if No Why  ANTIPLATELET:  Yes - if No why  BETA BLOCKER    yes,   IF  NO WHY  SMOKING HISTORY no COUNSELLED no  ATRIAL FIBRILLATIONno ANTICOAG: no    Assessment/ Plan:     Patient seen , interviewed and examined      -  Hypertension: Patients blood pressure is normal. Patient is advised about low sodium diet. Present medical regimen will not be changed. -  LIPID MANAGEMENT:  Available lipid  lab data reviewed  and patient was given dietary advice. NCEP- ATP III guidelines reviewed with patient. -   Changes  in medicines made: No                         -   DIABETES MELLITUS: Available pertinent lab data reviewed   and  patient was given dietary advice . Advised to check blood glucose level on a regular basis. -   Changes  in medicines made:  No              - PPM  stable         -     CORONARY ARTERY DISEASE: Patient

## 2018-02-14 NOTE — PROGRESS NOTES
Supple. No jugular venous distention noted. No carotid bruits. Cardiovascular  Normal S1 and S2 without obvious murmur or gallop. Extremities - No cyanosis, clubbing, or significant edema. Pulmonary  No respiratory distress. No wheezes or rales. Pulses: Bilateral radial and pedal pulses normal  Abdomen  no tenderness  Musculoskeletal  normal strength  Neurologic    There are  no gross focal neurologic abnormalities. Skin-  No rash  Affect; normal mood    DATA:  Lab Results   Component Value Date    CKTOTAL 40 04/04/2017    TROPONINI <0.006 01/02/2014    TROPONINI <0.006 03/18/2012     BNP:    Lab Results   Component Value Date    BNP 63 01/02/2014     PT/INR:  No results found for: PTINR  Lab Results   Component Value Date    LABA1C 9.5 (H) 09/14/2017    LABA1C 7.8 05/19/2017    LABA1C 7.8 05/19/2017     Lab Results   Component Value Date    CHOL 180 09/15/2017    TRIG 189 (H) 09/15/2017    HDL 34 (L) 09/15/2017    LDLDIRECT 123 (H) 09/15/2017     Lab Results   Component Value Date    ALT 13 09/14/2017    AST 17 09/14/2017     TSH:  No results found for: TSH      QUALITY MEASURES:  CAD:  Yes   CHOL LOWERING:  Yes- if No Why  ANTIPLATELET:  Yes - if No why  BETA BLOCKER    yes,   IF  NO WHY  SMOKING HISTORY no COUNSELLED no  ATRIAL FIBRILLATIONno ANTICOAG: no    Assessment/ Plan:     Patient seen , interviewed and examined      -  Hypertension: Patients blood pressure is normal. Patient is advised about low sodium diet. Present medical regimen will not be changed. -  LIPID MANAGEMENT:  Available lipid  lab data reviewed  and patient was given dietary advice. NCEP- ATP III guidelines reviewed with patient. -   Changes  in medicines made: hold statin                         -   DIABETES MELLITUS: Available pertinent lab data reviewed   and  patient was given dietary advice . Advised to check blood glucose level on a regular basis. -   Changes  in medicines made:  No                  - CORONARY ARTERY DISEASE: Patient  currently as per anginal classification has   CCS II - Slight limitation, with angina only during vigorous physical activity           - changes in  treatment:   No     All CAD tests available in records reviewed. - PAF on coumadin    - had CVA on coumadin    · -PACER  ANALYSIS:    Pacer analysis is reviewed and filed in the pacer chart. Analysis is consistent with normal  function with stable leads and appropriate battery status for the age of the device. Remaining average battery life is 6 mos. Patient is using pacer A pace43%, V pace87%. Recommend continued every three month check and follow up office visit as scheduled.     Clarita Woods MD, 2/14/2018 4:43 PM     - Need Labs, CBC, Chem , CPK

## 2018-02-21 PROBLEM — G70.9 NEUROMUSCULAR DISORDER (HCC): Status: ACTIVE | Noted: 2018-01-01

## 2018-02-21 PROBLEM — R29.90 STROKE-LIKE SYMPTOMS: Status: ACTIVE | Noted: 2018-01-01

## 2018-02-23 PROBLEM — R29.90 STROKE-LIKE SYMPTOMS: Status: RESOLVED | Noted: 2018-01-01 | Resolved: 2018-01-01

## 2018-02-23 PROBLEM — G70.9 NEUROMUSCULAR DISORDER (HCC): Status: RESOLVED | Noted: 2018-01-01 | Resolved: 2018-01-01

## 2018-05-23 PROBLEM — Z45.010 PACER AT END OF BATTERY LIFE: Status: ACTIVE | Noted: 2018-01-01

## 2018-05-30 PROBLEM — G93.40 ACUTE ENCEPHALOPATHY: Status: ACTIVE | Noted: 2018-01-01

## 2018-09-11 PROBLEM — R56.9 SEIZURES (HCC): Status: ACTIVE | Noted: 2018-01-01

## 2018-09-16 NOTE — PROGRESS NOTES
[address] [place] [address]  General Inpatient History and Physical      Date: 9/16/2018  Name: Remigio Vicente  MRN: [id number]  YOB: 1931  Admit Date: 9/16/2018  Primary Care:  Tresa Fitzgerald MD    Informant: Patient is minimally responsive. Wife is at bedside and provides history. Chief Complaint: Uncontrolled seizures    History of Present Illness: Remigio Vicente is a 80 y.o. male, who is admitted to [address] [place] Patient was admitted from [facility] where he was receiving rehab. .  He had new onset seizures. These were not controlled at [facility] with Keppra and Ativan. Wife tells me that he had 13 seizures on the date of admission. Depakote has been added and seizures have stopped. Patient is now minimally responsive. He's had only bites to eat in the last month and nothing to eat since he's been admitted. She knows that the hand is coming quickly. .   She relates that she had talked with the doctor from the nursing home 4- 5 months ago and was told prognosis was less than 6 months. He is not safe to be swallowing and has not been eating here. Wife initially considered having patient go back to [facility] for hospice and end-of-life but has requested that he stay at [facility]. Patient is not able to provide any history or follow any commands. He's had multiple strokes including a recent right MCA stroke causing left hemiparesis, expressive aphasia and dysphagia.       Past Medical History:   Diagnosis Date    Arthritis     spine, neck    Atrial fibrillation ([address]     Blood circulation, collateral     CAD (coronary artery disease)     follows with Dr Jada Tracey at least 4 stents\"    Cancer [facility])     per old chart hx of prostate ca in 1991 and again in [address] [facility])     basal cell ca on nose 2017    Carotid arterial disease ([address]     Chronic back pain     Collapsed lung     left lung    Diabetes mellitus ([address]     dx 10+ yrs ago    H/O cardiovascular stress test 09/08/2017    EF 26% smalll ischemic distal LAD    H/O cardiovascular stress test 01/24/2018    Ef52% normal study    History of chest x-ray 06/18/2018    [facility]: Stable heart size,no appreciable congrestive changes on the current study. minimal bibasilar atelectasis low lung volumes.  History of Doppler echocardiogram 11/25/2017    carotid mod plaque EV, mild plaque LICA    History of PTCA     Hx of CABG     Hyperlipidemia     Hypertension     Kidney stone     Kidney stone     per wife this is 33rd stone, also became septic    Memory loss     \"since his last stroke\" per wife    MI (myocardial infarction) ([facility] [address]     12    Pneumonia     per old chart admitted 9/2007 with this dx    Polyneuropathy, peripheral sensorimotor axonal 6/19/2017    Psychiatric problem     Restless leg syndrome     per old chart    Seizures ([facility] [address]     from [facility]( reaction to Keflex\"caused a seizure- total of 3 seizures in 18 hr period- that was about 2014\"    Unspecified cerebral artery occlusion with cerebral infarction 05/2017    S/P TPA. \"had six strokes started 5/2017\"- slight weakness left side, affected his memory also\" per wife       Past Surgical History   has a past surgical history that includes Prostatectomy; vascular surgery; Lithotripsy; Cystoscopy (12/29/11); Coronary artery bypass graft; Coronary angioplasty with stent; Abdomen surgery; Dilatation, esophagus; skin biopsy; back surgery; Appendectomy (1975?); Cholecystectomy (1985?); Colonoscopy (2012?); eye surgery; hernia repair (3895/[id number]); pacemaker placement (11/13/2010); and Pacemaker insertion (Left, 06/21/2018). Social History:    for 54 years. No smoking or alcohol history    Family History: family history includes Cancer in his father; Heart Disease in his brother, father, and mother; High Blood Pressure in his mother; Stroke in his mother. Old Records:  reviewed.      Advanced Directives:

## 2018-09-16 NOTE — PAYOR INFORMATION
Patient Acct Nbr:  [de-identified]  Primary AUTH/CERT:    [address] Name:   [employer]  Primary Insurance Plan Name:  [id number]   Primary Insurance Group Number:  =  Primary Insurance Plan Type: C  Primary Insurance Policy Number:  [id number]

## 2018-09-17 PROBLEM — Z51.5 HOSPICE CARE: Status: ACTIVE | Noted: 2018-01-01

## 2018-09-17 PROBLEM — Z45.010 PACEMAKER AT END OF BATTERY LIFE: Status: RESOLVED | Noted: 2018-01-01 | Resolved: 2018-01-01

## 2018-09-17 PROBLEM — I67.9 CEREBROVASCULAR DISEASE: Chronic | Status: ACTIVE | Noted: 2018-01-01

## 2018-09-17 PROBLEM — R07.2 PRECORDIAL PAIN: Status: RESOLVED | Noted: 2018-01-01 | Resolved: 2018-01-01

## 2018-09-17 PROBLEM — R26.9 GAIT DISTURBANCE: Status: RESOLVED | Noted: 2017-06-19 | Resolved: 2018-01-01

## 2018-09-17 NOTE — PROGRESS NOTES
[address] [place] [address] Inpatient Hospice Progress Note    Date: 9/17/2018  Name: Remigio Vicente  MRN: [id number]  YOB: 1931   Patient's PCP: Dr Aashish Mcfarlane Date: 9/16/2018 to 11 [facility]  Acute care admission: 9/11 to 9/16/2018    Subjective: The patient is unresponsive and appears comfortable. There have been no additional seizures. There are no family here. Discussed with the patient's nurse and the hospice nurse liaison. Objective:   Pain is managed with prn Morphine IV, Haloperidol and Lorazepam is available for agitation and anxiety. EEG 9/13/18  A case of Seizure disorder, old Left CVA, Right Hemisparesis. The background rhythm in this record consists of poorly organized poorly developed waves of 6.5-7 c/sec, bilaterally synchronous and symmetrical slightly depressed on the left hemisphere. Intermixed moderate diffuse slow wave activity of 4-6 c/sec is seen, more marked over Left Frontal areas. Most of the recording is slow waves; occasionally brief periods of slow alpha waves of 6-7 c/sec are seen. Hyperventilation was not performed. Photic Stimulation produced poor driving without any significant asymmetry. Drowsiness and sleep were achieved naturally. Usual Diffuse slow wave activity of 4-6 c/sec continued to occur during drowsy state. Impression:  Moderate encephalopathy with diffuse slow waves of 4-6 c/sec seen. Slow waves are more prominent over Left Frontal region, suggesting Left frontal focal encephalopathy. No definite epileptiform discharges are seen. CT-scan of the brain 9/10/18  Atrophy and chronic ischemic changes as above with no acute intracranial   abnormality.      CBC with Differential:    Lab Results   Component Value Date    WBC 9.9 09/12/2018    RBC 4.37 09/12/2018    HGB 12.4 09/12/2018    HCT 41.0 09/12/2018     09/12/2018    MCV 93.8 09/12/2018    MCH 28.4 09/12/2018    MCHC 30.2 pacemaker  5. Hypertension  6. DNR-comfort care  7. DVT prophylaxis: none given Hospice care      Patient Active Problem List   Diagnosis Code    Right ureteral stone N20.1    Hydronephrosis, right N13.30    Uncontrolled type 2 diabetes mellitus with diabetic polyneuropathy, without long-term current use of insulin ([facility]) E11.42, E11.65    Coronary atherosclerosis of autologous vein bypass graft I25.810    Essential hypertension I10    History of nephrolithiasis Z87.442    Renal failure N19    Bacteremia R78.81    Cerebrovascular accident (CVA) due to thrombosis of right anterior cerebral artery ([facility]) I63.321    Ischemic stroke ([facility]) I63.9    Hemiplegia and hemiparesis following cerebral infarction affecting left non-dominant side ([facility]) I69.354    Gait disturbance R26.9    Polyneuropathy, peripheral sensorimotor axonal G60.8    Dysarthria due to recent cerebral infarction I69.322    Precordial pain R07.2    EKG abnormalities R94.31    Coronary artery disease involving coronary bypass graft of native heart with unstable angina pectoris ([facility]) I25.700    Peripheral polyneuropathy G62.9    Pacemaker at end of battery life Z45.010    Acute encephalopathy G93.40    General weakness R53.1    Seizures ([facility]) R56.9       KWADWO Gayle MD, [facility]  9/17/2018

## 2018-09-17 NOTE — H&P
1999    Cancer [facility])       basal cell ca on nose 2017    Carotid arterial disease (HCC)      Chronic back pain      Collapsed lung       left lung    Diabetes mellitus ([facility]       dx 10+ yrs ago    H/O cardiovascular stress test 09/08/2017     EF 26% smalll ischemic distal LAD    H/O cardiovascular stress test 01/24/2018     Ef52% normal study    History of chest x-ray 06/18/2018     [facility]: Stable heart size,no appreciable congrestive changes on the current study. minimal bibasilar atelectasis low lung volumes.  History of Doppler echocardiogram 11/25/2017     carotid mod plaque EV, mild plaque LICA    History of PTCA      Hx of CABG      Hyperlipidemia      Hypertension      Kidney stone      Kidney stone       per wife this is 33rd stone, also became septic    Memory loss       \"since his last stroke\" per wife    MI (myocardial infarction) ([facility] [address]       1994    Pneumonia       per old chart admitted 9/2007 with this dx    Polyneuropathy, peripheral sensorimotor axonal 6/19/2017    Psychiatric problem      Restless leg syndrome       per old chart    Seizures ([facility]       [facility] reaction to Keflex\"caused a seizure- total of 3 seizures in 18 hr period- that was about 2014\"    Unspecified cerebral artery occlusion with cerebral infarction 05/2017     S/P TPA. \"had six strokes started 5/2017\"- slight weakness left side, affected his memory also\" per wife            Past Surgical History   has a past surgical history that includes Prostatectomy; vascular surgery; Lithotripsy; Cystoscopy (12/29/11); Coronary artery bypass graft; Coronary angioplasty with stent; Abdomen surgery; Dilatation, esophagus; skin biopsy; back surgery; Appendectomy (1975?); Cholecystectomy (1985?); Colonoscopy (2012?); eye surgery; hernia repair ([id number]); pacemaker placement (11/13/2010); and Pacemaker insertion (Left, 06/21/2018).    Social History:    for 54 years.   No smoking or alcohol

## 2018-09-18 NOTE — PROGRESS NOTES
HCT 41.0 09/12/2018     09/12/2018    MCV 93.8 09/12/2018    MCH 28.4 09/12/2018    MCHC 30.2 09/12/2018    RDW 12.3 09/12/2018    SEGSPCT 60.8 09/10/2018    LYMPHOPCT 25.9 09/10/2018    MONOPCT 9.0 09/10/2018    EOSPCT 3.8 03/18/2012    BASOPCT 0.4 09/10/2018    MONOSABS 0.9 09/10/2018    LYMPHSABS 2.7 09/10/2018    EOSABS 0.4 09/10/2018    BASOSABS 0.0 09/10/2018    DIFFTYPE AUTOMATED DIFFERENTIAL 09/10/2018     CMP:    Lab Results   Component Value Date     09/15/2018    K 3.9 09/15/2018     09/15/2018    CO2 24 09/15/2018    BUN 21 09/15/2018    CREATININE 1.2 09/15/2018    GFRAA >60 09/15/2018    LABGLOM 57 09/15/2018    GLUCOSE 151 09/15/2018    PROT 6.5 09/10/2018    PROT 6.0 03/19/2013    LABALBU 3.2 09/10/2018    LABALBU 41 12/30/2011    CALCIUM 8.9 09/15/2018    BILITOT 0.1 09/10/2018    ALKPHOS 88 09/10/2018    AST 12 09/10/2018    ALT 7 09/10/2018       Physical Exam:   /78   Pulse 81   Temp 98.3 °F (36.8 °C) (Axillary)   Resp 19   Ht 5' 10\" (1.778 m)   Wt 203 lb (92.1 kg)   SpO2 93%   BMI 29.13 kg/m²   General: Brief attempt to open eyes, in no distress  HEENT: Mucous membranes are dry, pupils are meiotic, mouth is open   Neck: no meningismus  Chest: pacer on left  Heart: distant tones, RRR, S1S2, no murmurs  Lungs:  Distant breath sounds bilaterally, diminished at the bases, without rales, scattered rhonchi   Abdomen: soft, bowel sounds quietly present, no apparent tenderness, nondistended  Extremities:  No mottling or edema  Neurologic: both upper extremities remain weak (evidently the right is chronic per Dr Vani Bradford note), no clonus, plantar reflex is equivocal    Assessment/Plan:     1. Encephalopathy with seizures and underlying Cerebrovascular disease and CVA's. The patient is less responsive per notes. Continue General Inpatient Hospice, no plans for artificial nutrition and hydration or aggressive care. Continue Levetiracetam and Depacon. DNR-comfort care.

## 2018-09-18 NOTE — PROGRESS NOTES
Family reports increased coughing . Perfect serve to DR Yasmine Cabrera to ask for scopolamine .   Pt does not appear to be candidate for suctioning now

## 2018-09-19 NOTE — PROGRESS NOTES
[address] [place] [address] Inpatient Hospice Progress Note    Date: 9/19/2018  Name: Brittany Szymansik  MRN: [id number]  YOB: 1931   Patient's PCP: Dr Gomez Fontana Date: 9/16/2018 to 11 [facility]  Acute care admission: 9/11 to 9/16/2018    Subjective: The patient does not respond to exam today. He appears comfortable. There have been no additional seizures. There are no family here currently, however, I was able to talk with the patient's spouse on 9/18/18 after I made rounds - she sees the decline, and wants comfort care. Objective:   Pain is managed with prn Morphine IV with 3 doses in the past 24 hours, Haloperidol and Lorazepam is available for agitation and anxiety. EEG 9/13/18  A case of Seizure disorder, old Left CVA, Right Hemisparesis. The background rhythm in this record consists of poorly organized poorly developed waves of 6.5-7 c/sec, bilaterally synchronous and symmetrical slightly depressed on the left hemisphere. Intermixed moderate diffuse slow wave activity of 4-6 c/sec is seen, more marked over Left Frontal areas. Most of the recording is slow waves; occasionally brief periods of slow alpha waves of 6-7 c/sec are seen. Hyperventilation was not performed. Photic Stimulation produced poor driving without any significant asymmetry. Drowsiness and sleep were achieved naturally. Usual Diffuse slow wave activity of 4-6 c/sec continued to occur during drowsy state. Impression:  Moderate encephalopathy with diffuse slow waves of 4-6 c/sec seen. Slow waves are more prominent over Left Frontal region, suggesting Left frontal focal encephalopathy. No definite epileptiform discharges are seen. CT-scan of the brain 9/10/18  Atrophy and chronic ischemic changes as above with no acute intracranial   abnormality.      CBC with Differential:    Lab Results   Component Value Date    WBC 9.9 09/12/2018    RBC 4.37 09/12/2018    HGB 12.4 2. Seizure disorder continue Levetiracetam and Depacon  3. CAD  4. Atrial fibrillation with Permanent pacemaker  5. Hypertension  6. DNR-comfort care  7. DVT prophylaxis: none given Hospice care      Patient Active Problem List   Diagnosis Code    Uncontrolled type 2 diabetes mellitus with diabetic polyneuropathy, without long-term current use of insulin ([facility]) E11.42, E11.65    Coronary atherosclerosis of autologous vein bypass graft I25.810    Essential hypertension I10    Cerebrovascular accident (CVA) due to thrombosis of right anterior cerebral artery ([facility] I63.321    Ischemic stroke ([facility] I63.9    Hemiplegia and hemiparesis following cerebral infarction affecting left non-dominant side ([facility]) I69.354    Polyneuropathy, 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

## 2018-09-19 NOTE — PLAN OF CARE
Problem: Falls - Risk of:  Goal: Will remain free from falls  Will remain free from falls   Outcome: Ongoing      Problem: Discharge Planning:  Goal: Participates in care planning  Participates in care planning   Outcome: Ongoing      Problem: Grieving:  Goal: Verbalizes feelings, concerns and thoughts  Verbalizes feelings, concerns and thoughts   Outcome: Ongoing      Problem: Mood - Altered:  Goal: Mood stable  Mood stable   Outcome: Ongoing      Problem: Pain:  Goal: Pain level will decrease  Pain level will decrease   Outcome: Ongoing

## 2018-09-20 NOTE — PLAN OF CARE
Problem: Falls - Risk of:  Goal: Will remain free from falls  Will remain free from falls   Outcome: Ongoing    Goal: Absence of physical injury  Absence of physical injury   Outcome: Ongoing      Problem: Discharge Planning:  Goal: Participates in care planning  Participates in care planning   Outcome: Ongoing      Problem:  Bowel Function - Altered:  Goal: Bowel elimination without discomfort  Bowel elimination without discomfort   Outcome: Ongoing      Problem: Breathing Pattern - Ineffective:  Goal: Able to breathe comfortably  Able to breathe comfortably   Outcome: Ongoing    Goal: Absence of respiratory distress  Absence of respiratory distress   Outcome: Ongoing    Goal: Ability to achieve and maintain a regular respiratory rate will improve  Ability to achieve and maintain a regular respiratory rate will improve   Outcome: Ongoing    Goal: Minimal restlessness  Minimal restlessness   Outcome: Ongoing      Problem: Grieving:  Goal: Verbalizes feelings, concerns and thoughts  Verbalizes feelings, concerns and thoughts   Outcome: Ongoing    Goal: Able to identify stages of grief  Able to identify stages of grief   Outcome: Ongoing      Problem: Mood - Altered:  Goal: Mood stable  Mood stable   Outcome: Ongoing    Goal: Level of anxiety will decrease  Level of anxiety will decrease   Outcome: Ongoing      Problem: Nutrition Deficit:  Goal: Ability to achieve adequate nutritional intake will improve  Ability to achieve adequate nutritional intake will improve   Outcome: Ongoing      Problem: Pain:  Goal: Pain level will decrease  Pain level will decrease   Outcome: Ongoing    Goal: Recognizes and communicates pain  Recognizes and communicates pain   Outcome: Ongoing

## 2018-09-20 NOTE — PROGRESS NOTES
pacemaker  5. Hypertension  6. DNR-comfort care  7. DVT prophylaxis: none given Hospice care      Patient Active Problem List   Diagnosis Code    Uncontrolled type 2 diabetes mellitus with diabetic polyneuropathy, without long-term current use of insulin ([facility]) E11.42, E11.65    Coronary atherosclerosis of autologous vein bypass graft I25.810    Essential hypertension I10    Cerebrovascular accident (CVA) due to thrombosis of right anterior cerebral artery ([facility] I63.321    Ischemic stroke ([facility] I63.9    Hemiplegia and hemiparesis following cerebral infarction affecting left non-dominant side ([facility]) I69.354    Polyneuropathy, 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

## 2018-09-20 NOTE — PLAN OF CARE
Problem: Falls - Risk of:  Goal: Will remain free from falls  Will remain free from falls   Outcome: Ongoing      Problem: Breathing Pattern - Ineffective:  Goal: Able to breathe comfortably  Able to breathe comfortably   Outcome: Ongoing      Problem: Grieving:  Goal: Verbalizes feelings, concerns and thoughts  Verbalizes feelings, concerns and thoughts   Outcome: Ongoing      Problem: Pain:  Goal: Pain level will decrease  Pain level will decrease   Outcome: Ongoing      Problem: Pressure Ulcer:  Goal: Absence of pressure ulcer  Absence of pressure ulcer   Outcome: Ongoing

## 2018-09-21 NOTE — PROGRESS NOTES
hydration or aggressive care. Continue Levetiracetam and Depacon. DNR-comfort care. PPS 10%  2. Seizure disorder continue Levetiracetam and Depacon  3. CAD  4. Atrial fibrillation with Permanent pacemaker  5. Hypertension  6. DNR-comfort care  7. DVT prophylaxis: none given Hospice care      Patient Active Problem List   Diagnosis Code    Uncontrolled type 2 diabetes mellitus with diabetic polyneuropathy, without long-term current use of insulin ([facility]) E11.42, E11.65    Coronary atherosclerosis of autologous vein bypass graft I25.810    Essential hypertension I10    Cerebrovascular accident (CVA) due to thrombosis of right anterior cerebral artery ([facility] I63.321    Ischemic stroke ([facility] I63.9    Hemiplegia and hemiparesis following cerebral infarction affecting left non-dominant side ([facility]) I69.354    Polyneuropathy, 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

## 2018-09-22 NOTE — PROGRESS NOTES
Pt resting comfortably and breathing stable, RN unable to get a response or eye opening to pain stimuli. Will continue to monitor.

## 2018-09-22 NOTE — PROGRESS NOTES
Neurologic: both upper extremities remain weak (evidently the right is chronic per Dr Christiano Wild note), plantar reflex is equivocal    Assessment/Plan:     1. Encephalopathy with seizures and underlying Cerebrovascular disease and multiple CVA's. The patient continues with daily decline and continue General Inpatient Hospice, with no plans for artificial nutrition, hydration or aggressive care. Continue Levetiracetam and Depacon. DNR-comfort care. PPS 10%  2. Seizure disorder continue Levetiracetam and Depacon  3. CAD  4. Atrial fibrillation with Permanent pacemaker  5. Hypertension  6. DNR-comfort care  7. DVT prophylaxis: none given Hospice care  8.  Family angst regarding the dying process- provide education and support      Patient Active Problem List   Diagnosis Code    Uncontrolled type 2 diabetes mellitus with diabetic polyneuropathy, without long-term current use of insulin ([facility]) E11.42, E11.65    Coronary atherosclerosis of autologous vein bypass graft I25.810    Essential hypertension I10    Cerebrovascular accident (CVA) due to thrombosis of right anterior cerebral artery ([facility] I63.321    Ischemic stroke ([facility] I63.9    Hemiplegia and hemiparesis following cerebral infarction affecting left non-dominant side ([facility]) I69.354    Polyneuropathy, 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

## 2018-09-22 NOTE — PLAN OF CARE
Problem: Falls - Risk of:  Goal: Will remain free from falls  Will remain free from falls   Outcome: Ongoing    Goal: Absence of physical injury  Absence of physical injury   Outcome: Ongoing      Problem: Discharge Planning:  Goal: Participates in care planning  Participates in care planning   Outcome: Ongoing      Problem:  Bowel Function - Altered:  Goal: Bowel elimination without discomfort  Bowel elimination without discomfort   Outcome: Ongoing      Problem: Breathing Pattern - Ineffective:  Goal: Able to breathe comfortably  Able to breathe comfortably   Outcome: Ongoing    Goal: Absence of respiratory distress  Absence of respiratory distress   Outcome: Ongoing    Goal: Ability to achieve and maintain a regular respiratory rate will improve  Ability to achieve and maintain a regular respiratory rate will improve   Outcome: Ongoing    Goal: Minimal restlessness  Minimal restlessness   Outcome: Ongoing      Problem: Grieving:  Goal: Verbalizes feelings, concerns and thoughts  Verbalizes feelings, concerns and thoughts   Outcome: Ongoing    Goal: Able to identify stages of grief  Able to identify stages of grief   Outcome: Ongoing      Problem: Mood - Altered:  Goal: Mood stable  Mood stable   Outcome: Ongoing    Goal: Level of anxiety will decrease  Level of anxiety will decrease   Outcome: Ongoing    Goal: Verbalizations of depressive symptoms will decrease  Verbalizations of depressive symptoms will decrease   Outcome: Ongoing      Problem: Nutrition Deficit:  Goal: Ability to achieve adequate nutritional intake will improve  Ability to achieve adequate nutritional intake will improve   Outcome: Ongoing      Problem: Pain:  Goal: Pain level will decrease  Pain level will decrease   Outcome: Ongoing    Goal: Recognizes and communicates pain  Recognizes and communicates pain   Outcome: Ongoing      Problem: Pressure Ulcer:  Goal: Absence of pressure ulcer  Absence of pressure ulcer   Outcome: Ongoing    Goal: Decrease in pressure ulcer size  Decrease in pressure ulcer size   Outcome: Ongoing    Goal: Signs of wound healing will improve  Signs of wound healing will improve   Outcome: Ongoing

## 2018-09-23 NOTE — PLAN OF CARE
Decrease in pressure ulcer size  Decrease in pressure ulcer size   Outcome: Ongoing    Goal: Signs of wound healing will improve  Signs of wound healing will improve   Outcome: Ongoing

## 2018-09-23 NOTE — PROGRESS NOTES
Stimulation produced poor driving without any significant asymmetry.     Drowsiness and sleep were achieved naturally.   Usual Diffuse slow wave activity of 4-6 c/sec continued to occur during drowsy state.   Impression:  Moderate encephalopathy with diffuse slow waves of 4-6 c/sec seen.   Slow waves are more prominent over Left Frontal region, suggesting Left frontal focal encephalopathy.  No definite epileptiform discharges are seen.       CT-scan of the brain 9/10/18  Atrophy and chronic ischemic changes as above with no acute intracranial   abnormality.      CBC with Differential:          Lab Results   Component Value Date     WBC 9.9 09/12/2018     RBC 4.37 09/12/2018     HGB 12.4 09/12/2018     HCT 41.0 09/12/2018      09/12/2018     MCV 93.8 09/12/2018     MCH 28.4 09/12/2018     MCHC 30.2 09/12/2018     RDW 12.3 09/12/2018     SEGSPCT 60.8 09/10/2018     LYMPHOPCT 25.9 09/10/2018     MONOPCT 9.0 09/10/2018     EOSPCT 3.8 03/18/2012     BASOPCT 0.4 09/10/2018     MONOSABS 0.9 09/10/2018     LYMPHSABS 2.7 09/10/2018     EOSABS 0.4 09/10/2018     BASOSABS 0.0 09/10/2018     DIFFTYPE AUTOMATED DIFFERENTIAL 09/10/2018      CMP:          Lab Results   Component Value Date      09/15/2018     K 3.9 09/15/2018      09/15/2018     CO2 24 09/15/2018     BUN 21 09/15/2018     CREATININE 1.2 09/15/2018     GFRAA >60 09/15/2018     LABGLOM 57 09/15/2018     GLUCOSE 151 09/15/2018     PROT 6.5 09/10/2018     PROT 6.0 03/19/2013     LABALBU 3.2 09/10/2018     LABALBU 41 12/30/2011     CALCIUM 8.9 09/15/2018     BILITOT 0.1 09/10/2018     ALKPHOS 88 09/10/2018     AST 12 09/10/2018     ALT 7 09/10/2018         Physical Exam:   /65   Pulse 94   Temp 100.1 °F (37.8 °C) (Axillary)   Resp 14   Ht 5' 10\" (1.778 m)   Wt 203 lb (92.1 kg)   SpO2 91%   BMI 29.13 kg/m²   General: unresponsive, occasional moan, appears very peaceful as if sleeping, warm and dry with no mottling  HEENT: Mucous membranes E11.65    Coronary atherosclerosis of autologous vein bypass graft I25.810    Essential hypertension I10    Cerebrovascular accident (CVA) due to thrombosis of right anterior cerebral artery ([facility] I63.321    Ischemic stroke ([facility]) I63.9    Hemiplegia and hemiparesis following cerebral infarction affecting left non-dominant side ([facility]) I69.354    Polyneuropathy, 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 5         balaji travis DO  9/22/2018

## 2018-09-24 NOTE — PLAN OF CARE
Problem: Falls - Risk of:  Goal: Will remain free from falls  Will remain free from falls   Outcome: Ongoing    Goal: Absence of physical injury  Absence of physical injury   Outcome: Ongoing      Problem: Discharge Planning:  Goal: Participates in care planning  Participates in care planning   Outcome: Ongoing      Problem:  Bowel Function - Altered:  Goal: Bowel elimination without discomfort  Bowel elimination without discomfort   Outcome: Ongoing      Problem: Breathing Pattern - Ineffective:  Goal: Able to breathe comfortably  Able to breathe comfortably   Outcome: Ongoing    Goal: Ability to achieve and maintain a regular respiratory rate will improve  Ability to achieve and maintain a regular respiratory rate will improve   Outcome: Ongoing

## 2018-09-25 NOTE — DISCHARGE SUMMARY
who is admitted to [address] at [facility] Patient was admitted from [facility] where he was receiving rehab. Gabriela Howard had new onset seizures. Espiridion Short were not controlled at [facility] with Keppra and Ativan.  Wife tells me that he had 13 seizures on the date of admission.  Depakote has been added and seizures have stopped. Marci Leal is now minimally responsive.  He's had only bites to eat in the last month and nothing to eat since he's been admitted. Poncho Bai knows that the end is coming quickly. She relates that she had talked with the doctor from the nursing home 4- 5 months ago and was told prognosis was less than 6 months. He is not safe to be swallowing and has not been eating here. Danielle Perry initially considered having patient go back to [facility] for hospice and end-of-life but has requested that he stay at [address] is not able to provide any history or follow any commands.  He's had multiple strokes including a recent right MCA stroke causing left hemiparesis, expressive aphasia and dysphagia. Hospital Course: The patient was admitted to [facility] with the above, for complete details, please see the acute care History and Physical, progress notes, consultant notes and discharge summary. The patient was treated with comfort medications, and symptoms were managed. Emotional and spiritual support was provided to the patient and family. The patient  as noted above.     Cause of death: Cerebrovascular disease with multiple CVA's    Significant Diagnostic Studies:  See computerized record in Justin Reyes MD, [facility]   2018